# Patient Record
Sex: MALE | Race: WHITE | URBAN - METROPOLITAN AREA
[De-identification: names, ages, dates, MRNs, and addresses within clinical notes are randomized per-mention and may not be internally consistent; named-entity substitution may affect disease eponyms.]

---

## 2017-01-11 ENCOUNTER — HISTORICAL (OUTPATIENT)
Dept: ADMINISTRATIVE | Facility: HOSPITAL | Age: 72
End: 2017-01-11

## 2017-04-04 ENCOUNTER — HISTORICAL (OUTPATIENT)
Dept: ADMINISTRATIVE | Facility: HOSPITAL | Age: 72
End: 2017-04-04

## 2017-04-11 ENCOUNTER — HISTORICAL (OUTPATIENT)
Dept: ADMINISTRATIVE | Facility: HOSPITAL | Age: 72
End: 2017-04-11

## 2017-05-17 ENCOUNTER — HISTORICAL (OUTPATIENT)
Dept: ADMINISTRATIVE | Facility: HOSPITAL | Age: 72
End: 2017-05-17

## 2017-08-02 ENCOUNTER — HISTORICAL (OUTPATIENT)
Dept: ADMINISTRATIVE | Facility: HOSPITAL | Age: 72
End: 2017-08-02

## 2017-08-02 LAB — POTASSIUM SERPL-SCNC: 4.5 MMOL/L (ref 3.5–5.1)

## 2017-08-10 ENCOUNTER — HISTORICAL (OUTPATIENT)
Dept: ADMINISTRATIVE | Facility: HOSPITAL | Age: 72
End: 2017-08-10

## 2017-08-10 LAB
ERYTHROCYTE [DISTWIDTH] IN BLOOD BY AUTOMATED COUNT: 13.7 % (ref 11.5–17)
HCT VFR BLD AUTO: 34.3 % (ref 42–52)
HGB BLD-MCNC: 11.3 GM/DL (ref 14–18)
MCH RBC QN AUTO: 30.9 PG (ref 27–31)
MCHC RBC AUTO-ENTMCNC: 33 GM/DL (ref 33–36)
MCV RBC AUTO: 93.6 FL (ref 80–94)
PLATELET # BLD AUTO: 149 X10(3)/MCL (ref 130–400)
PMV BLD AUTO: 6.9 FL (ref 7.4–10.4)
RBC # BLD AUTO: 3.67 X10(6)/MCL (ref 4.7–6.1)
WBC # SPEC AUTO: 6.7 X10(3)/MCL (ref 4.5–11.5)

## 2017-09-06 ENCOUNTER — HISTORICAL (OUTPATIENT)
Dept: ADMINISTRATIVE | Facility: HOSPITAL | Age: 72
End: 2017-09-06

## 2017-09-06 LAB
ABS NEUT (OLG): 2.96 X10(3)/MCL (ref 2.1–9.2)
ALBUMIN SERPL-MCNC: 3.7 GM/DL (ref 3.4–5)
ALBUMIN/GLOB SERPL: 1.1 RATIO (ref 1.1–2)
ALP SERPL-CCNC: 77 UNIT/L (ref 50–136)
ALT SERPL-CCNC: 31 UNIT/L (ref 12–78)
APPEARANCE, UA: ABNORMAL
AST SERPL-CCNC: 22 UNIT/L (ref 15–37)
BACTERIA SPEC CULT: ABNORMAL /HPF
BASOPHILS # BLD AUTO: 0 X10(3)/MCL (ref 0–0.2)
BASOPHILS NFR BLD AUTO: 1 %
BILIRUB SERPL-MCNC: 0.3 MG/DL (ref 0.2–1)
BILIRUB UR QL STRIP: NEGATIVE
BILIRUBIN DIRECT+TOT PNL SERPL-MCNC: 0.1 MG/DL (ref 0–0.5)
BILIRUBIN DIRECT+TOT PNL SERPL-MCNC: 0.2 MG/DL (ref 0–0.8)
BUN SERPL-MCNC: 32 MG/DL (ref 7–18)
CALCIUM SERPL-MCNC: 10 MG/DL (ref 8.5–10.1)
CHLORIDE SERPL-SCNC: 103 MMOL/L (ref 98–107)
CHOLEST SERPL-MCNC: 139 MG/DL (ref 0–200)
CHOLEST/HDLC SERPL: 3.2 {RATIO} (ref 0–5)
CO2 SERPL-SCNC: 25 MMOL/L (ref 21–32)
COLOR UR: YELLOW
CREAT SERPL-MCNC: 1.67 MG/DL (ref 0.7–1.3)
CREAT UR-MCNC: 38.5 MG/DL
EOSINOPHIL # BLD AUTO: 0.3 X10(3)/MCL (ref 0–0.9)
EOSINOPHIL NFR BLD AUTO: 6 %
ERYTHROCYTE [DISTWIDTH] IN BLOOD BY AUTOMATED COUNT: 13.1 % (ref 11.5–17)
EST. AVERAGE GLUCOSE BLD GHB EST-MCNC: 214 MG/DL
GLOBULIN SER-MCNC: 3.5 GM/DL (ref 2.4–3.5)
GLUCOSE (UA): ABNORMAL
GLUCOSE SERPL-MCNC: 303 MG/DL (ref 74–106)
HBA1C MFR BLD: 9.1 % (ref 4.2–6.3)
HCT VFR BLD AUTO: 37.4 % (ref 42–52)
HDLC SERPL-MCNC: 44 MG/DL (ref 35–60)
HGB BLD-MCNC: 12 GM/DL (ref 14–18)
HGB UR QL STRIP: NEGATIVE
KETONES UR QL STRIP: NEGATIVE
LDLC SERPL CALC-MCNC: 44 MG/DL (ref 0–129)
LEUKOCYTE ESTERASE UR QL STRIP: NEGATIVE
LYMPHOCYTES # BLD AUTO: 1.5 X10(3)/MCL (ref 0.6–4.6)
LYMPHOCYTES NFR BLD AUTO: 28 %
MCH RBC QN AUTO: 31.5 PG (ref 27–31)
MCHC RBC AUTO-ENTMCNC: 32.1 GM/DL (ref 33–36)
MCV RBC AUTO: 98.2 FL (ref 80–94)
MICROALBUMIN UR-MCNC: 3.6 MG/DL
MICROALBUMIN/CREAT RATIO PNL UR: 93.5 MG/GM CR (ref 0–30)
MONOCYTES # BLD AUTO: 0.5 X10(3)/MCL (ref 0.1–1.3)
MONOCYTES NFR BLD AUTO: 9 %
NEUTROPHILS # BLD AUTO: 2.96 X10(3)/MCL (ref 1.4–7.9)
NEUTROPHILS NFR BLD AUTO: 56 %
NITRITE UR QL STRIP: NEGATIVE
PH UR STRIP: 5 [PH] (ref 5–9)
PLATELET # BLD AUTO: 171 X10(3)/MCL (ref 130–400)
PMV BLD AUTO: 9.1 FL (ref 9.4–12.4)
POTASSIUM SERPL-SCNC: 5.8 MMOL/L (ref 3.5–5.1)
PROT SERPL-MCNC: 7.2 GM/DL (ref 6.4–8.2)
PROT UR QL STRIP: ABNORMAL
RBC # BLD AUTO: 3.81 X10(6)/MCL (ref 4.7–6.1)
RBC #/AREA URNS HPF: ABNORMAL /[HPF]
SODIUM SERPL-SCNC: 139 MMOL/L (ref 136–145)
SP GR UR STRIP: 1.02 (ref 1–1.03)
SQUAMOUS EPITHELIAL, UA: ABNORMAL
TRIGL SERPL-MCNC: 255 MG/DL (ref 30–150)
TSH SERPL-ACNC: 0.58 MIU/ML (ref 0.36–3.74)
UROBILINOGEN UR STRIP-ACNC: 0.2
VLDLC SERPL CALC-MCNC: 51 MG/DL
WBC # SPEC AUTO: 5.3 X10(3)/MCL (ref 4.5–11.5)
WBC #/AREA URNS HPF: 25 /HPF (ref 0–3)

## 2017-12-15 ENCOUNTER — HISTORICAL (OUTPATIENT)
Dept: ADMINISTRATIVE | Facility: HOSPITAL | Age: 72
End: 2017-12-15

## 2017-12-15 LAB
EST. AVERAGE GLUCOSE BLD GHB EST-MCNC: 203 MG/DL
HBA1C MFR BLD: 8.7 % (ref 4.5–6.2)

## 2018-03-16 ENCOUNTER — HISTORICAL (OUTPATIENT)
Dept: ADMINISTRATIVE | Facility: HOSPITAL | Age: 73
End: 2018-03-16

## 2018-03-16 LAB
EST. AVERAGE GLUCOSE BLD GHB EST-MCNC: 200 MG/DL
HBA1C MFR BLD: 8.6 % (ref 4.2–6.3)

## 2018-09-17 ENCOUNTER — HISTORICAL (OUTPATIENT)
Dept: ADMINISTRATIVE | Facility: HOSPITAL | Age: 73
End: 2018-09-17

## 2018-09-17 LAB
ALBUMIN SERPL-MCNC: 3.5 GM/DL (ref 3.4–5)
ALBUMIN/GLOB SERPL: 0.9 RATIO (ref 1.1–2)
ALP SERPL-CCNC: 79 UNIT/L (ref 50–136)
ALT SERPL-CCNC: 32 UNIT/L (ref 12–78)
AST SERPL-CCNC: 27 UNIT/L (ref 15–37)
BILIRUB SERPL-MCNC: 0.3 MG/DL (ref 0.2–1)
BILIRUBIN DIRECT+TOT PNL SERPL-MCNC: 0.1 MG/DL (ref 0–0.5)
BILIRUBIN DIRECT+TOT PNL SERPL-MCNC: 0.2 MG/DL (ref 0–0.8)
BUN SERPL-MCNC: 20 MG/DL (ref 7–18)
CALCIUM SERPL-MCNC: 9.5 MG/DL (ref 8.5–10.1)
CHLORIDE SERPL-SCNC: 97 MMOL/L (ref 98–107)
CO2 SERPL-SCNC: 27 MMOL/L (ref 21–32)
CREAT SERPL-MCNC: 1.41 MG/DL (ref 0.7–1.3)
EST. AVERAGE GLUCOSE BLD GHB EST-MCNC: 283 MG/DL
GLOBULIN SER-MCNC: 3.8 GM/DL (ref 2.4–3.5)
GLUCOSE SERPL-MCNC: 418 MG/DL (ref 74–106)
HBA1C MFR BLD: 11.5 % (ref 4.2–6.3)
POTASSIUM SERPL-SCNC: 4.8 MMOL/L (ref 3.5–5.1)
PROT SERPL-MCNC: 7.3 GM/DL (ref 6.4–8.2)
SODIUM SERPL-SCNC: 133 MMOL/L (ref 136–145)

## 2022-09-13 NOTE — HISTORICAL [facility]ERNER
This is a historical note converted from [place]. Formatting and pictures may have been removed.  Please reference [place] for original formatting and attached multimedia. Chief Complaint  Left tibia and knee  History of Present Illness  This 72-year-old?gentleman returns for his left tibia and knee.? He is interested in discussing surgery.? However he has been found to have significant?peripheral arterial disease. ?He recently underwent stenting of his right lower extremity.? He is scheduled for stenting of his left lower extremity.? The patient states that he has not been doing a good job of?maintaining glucose control.? His latest hemoglobin A1c was 8.6.  Review of Systems  Constitutional: No fever, weakness, or fatigue.  Ear/Nose/Mouth/Throat: No nasal congestion or sore throat.  Respiratory: No?current shortness of breath or cough.? COPD with occasional shortness of breath  Cardiovascular: No chest pain, palpitations, or peripheral edema.  Gastrointestinal: No nausea, vomiting, or abdominal pain.  Genitourinary: No dysuria.  Musculoskeletal: See current complaints  Integumentary: Negative.  Physical Exam  Physical examination shows that the patient has a lateral thrust when he walks.? He is developing?laxity in his lateral collateral ligaments.? Tibia vara is noted. ?He has no pain in his tibia itself.? He appears to be motor intact. ?He has?moderate?diabetic neuropathy?below the mid tibia.? Pulses are not palpable today.  ?  AP and lateral?of the?left tibia shows his malunion?with tibia vara.  Assessment/Plan  1.?Pain in left knee  The findings were reviewed with the patient and he expressed understanding.? The patient is interested in osteotomy but I do not recommend doing the surgery until his diabetes is under better control.? The patient states that he is also having problems with his right shoulder.? When he returns he would like an x-ray of his right shoulder.? The patient will call us when he is ready to  return.? He is scheduled for peripheral stenting of his left lower extremity?on August 8.? The patient is instructed to call if he has any problems.  Ordered:  Office/Outpatient Visit Level 3 Established [id number] PC, Pain in left knee  Left tibia vara  Primary osteoarthritis, right shoulder, [facility], 08/02/17 11:39:00 CDT  XR Shoulder Right Minimum 2 Views, Routine, *Est. 02/02/18 3:00:00 CST, Arthritis, None, Ambulatory, Patient Has IV?, Rad Type, Order for future visit, Pain in left knee  Left tibia vara  Primary osteoarthritis, right shoulder, Not Scheduled, 6, month(s), In Approximately, *Est. 02/0...  ?  2.?Left tibia vara  Ordered:  Office/Outpatient Visit Level 3 Established [id number] PC, Pain in left knee  Left tibia vara  Primary osteoarthritis, right shoulder, [facility], 08/02/17 11:39:00 CDT  XR Shoulder Right Minimum 2 Views, Routine, *Est. 02/02/18 3:00:00 CST, Arthritis, None, Ambulatory, Patient Has IV?, Rad Type, Order for future visit, Pain in left knee  Left tibia vara  Primary osteoarthritis, right shoulder, Not Scheduled, 6, month(s), In Approximately, *Est. 02/0...  ?  3.?Primary osteoarthritis, right shoulder  Ordered:  Office/Outpatient Visit Level 3 Established [id number] PC, Pain in left knee  Left tibia vara  Primary osteoarthritis, right shoulder, [facility], 08/02/17 11:39:00 CDT  XR Shoulder Right Minimum 2 Views, Routine, *Est. 02/02/18 3:00:00 CST, Arthritis, None, Ambulatory, Patient Has IV?, Rad Type, Order for future visit, Pain in left knee  Left tibia vara  Primary osteoarthritis, right shoulder, Not Scheduled, 6, month(s), In Approximately, *Est. 02/0...  ?  Pain in left lower leg  ?  Orders:  Clinic Follow-up PRN, 08/02/17 11:39:00 CDT, Future Order, [facility]   Problem List/Past Medical History  Acid reflux  Apnea, sleep  Arthritis  Cataracts, bilateral  Claudication  Complex tear of medial meniscus of left knee  Coronary artery  disease  Dementia  Diabetes Type II  ED (erectile dysfunction)  Hypertension  Hypertension  Impingement syndrome of right shoulder  Left tibia vara  Mixed hyperlipidemia  Morbid obesity  Need for influenza vaccination  Pain in left knee  Primary osteoarthritis, right shoulder  PTSD (post-traumatic stress disorder)  Sprain of right rotator cuff capsule, initial encounter  TIA  Tibia fracture  Historical  Atherosclerosis artery  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  Diabetes mellitus  Hearing impaired  Hyperlipidemia  Post-traumatic stress disorder  Wound  Procedure/Surgical History  Arthroscopy Knee (Left) (04/13/2017), Arthroscopy, knee, surgical; with meniscectomy (medial OR lateral, including any meniscal shaving) including debridement/shaving of articular cartilage (chondroplasty), same or separate compartment(s), when performed (04/13/2017), Excision of Left Knee Joint, Percutaneous Endoscopic Approach (04/13/2017), Extirpation of Matter from Left Knee Joint, Percutaneous Endoscopic Approach (04/13/2017), ORIF Tibia Plateau (Left) (08/29/2016), Reposition Left Tibia with Internal Fixation Device, Open Approach (08/29/2016), Open treatment of tibial fracture, proximal (plateau); unicondylar, includes internal fixation, when performed (07/07/2016), Procedure Cancelled Intraoperatively (Left) (07/07/2016), Avulsion of nail plate, partial or complete, simple; single (06/28/2016), Application of short leg splint (calf to foot) (01/30/2016), Immobilization of Left Lower Extremity using Splint (01/30/2016), TRIPLE BYPASS (2010), Ankle, BACK SX, CAROTID ARTERY SX, CHOLECYSTECTOMY, Lower back (surface region), Neck, RIGHT & Left CATARACT SX.  Medications  Buspar 15 mg oral tablet, 15 mg, Oral, BID  donepezil 10 mg oral tablet, 10 mg, 1 tab(s), Oral, Once a day (at bedtime)  Eliquis 5 mg oral tablet, 5 mg, 1 tab(s), Oral, BID  folic acid 1 mg oral tablet, 1 mg, 1 tab(s), Oral, Daily  gabapentin 300 mg oral  capsule, 300 mg, 1 cap(s), Oral, BID  Levemir, 65 units, Subcutaneous, BID  lisinopril 10 mg oral tablet, 20 mg, 2 tab(s), Oral, Daily  magnesium oxide, 1 tab(s), Oral, BID  memantine 5 mg oral tablet, See Instructions, 5 refills  metformin 500 mg oral tablet, 500 mg, 1 tab(s), Oral, BID  metoprolol tartrate 25 mg oral tab, 12.5 mg, 0.5 tab(s), Oral, BID  NovoLOG FlexPen, 30 units, Subcutaneous, TIDAC  Paxil 20 mg oral tablet, 20 mg, 1 tab(s), Oral, Daily  Percocet 5/325, 1 tab(s), Oral, q4hr, PRN  Plavix 75 mg oral tablet, 75 mg, 1 tab(s), Oral, Daily  Probiotic Formula, 1 cap(s), Oral, Daily  ranitidine, 1 tab(s), Oral, BID  rosuvastatin 10 mg oral tablet, 10 mg, 1 tab(s), Oral, Once a day (at bedtime)  Vitamin B1 100 mg oral tablet, 100 mg, 1 tab(s), Oral, Daily  Allergies  No Known Medication Allergies  Social History  Alcohol - Denies Alcohol Use  Past  Employment/School  Highest education level: High school.  Exercise  Home/Environment  Lives with Alone. Home equipment: Wheelchair.  Nutrition/Health  Regular  Sexual  Sexually active: No.  Substance Abuse - Denies Substance Abuse  Never  Never  Tobacco - Denies Tobacco Use  Former smoker  Former smoker  Family History  Alzheimers disease: Mother.  Cardiac disease: Mother.  Dementia: Mother.  Diabetes mellitus type 2: Mother and Brother.  Lung cancer.: Father.